# Patient Record
Sex: FEMALE | Race: WHITE | Employment: UNEMPLOYED | URBAN - METROPOLITAN AREA
[De-identification: names, ages, dates, MRNs, and addresses within clinical notes are randomized per-mention and may not be internally consistent; named-entity substitution may affect disease eponyms.]

---

## 2017-04-03 ENCOUNTER — HOSPITAL ENCOUNTER (EMERGENCY)
Facility: HOSPITAL | Age: 42
Discharge: HOME | End: 2017-04-04
Attending: EMERGENCY MEDICINE
Payer: COMMERCIAL

## 2017-04-03 ENCOUNTER — OFFICE VISIT (OUTPATIENT)
Dept: INTERNAL MEDICINE CLINIC | Facility: CLINIC | Age: 42
End: 2017-04-03

## 2017-04-03 VITALS
RESPIRATION RATE: 18 BRPM | SYSTOLIC BLOOD PRESSURE: 116 MMHG | DIASTOLIC BLOOD PRESSURE: 70 MMHG | OXYGEN SATURATION: 94 % | HEIGHT: 64 IN | WEIGHT: 170 LBS | TEMPERATURE: 100 F | HEART RATE: 108 BPM | BODY MASS INDEX: 29.02 KG/M2

## 2017-04-03 VITALS
HEART RATE: 112 BPM | OXYGEN SATURATION: 98 % | SYSTOLIC BLOOD PRESSURE: 94 MMHG | WEIGHT: 188 LBS | TEMPERATURE: 99 F | DIASTOLIC BLOOD PRESSURE: 68 MMHG | HEIGHT: 64 IN | RESPIRATION RATE: 16 BRPM | BODY MASS INDEX: 32.1 KG/M2

## 2017-04-03 DIAGNOSIS — J02.0 ACUTE STREPTOCOCCAL PHARYNGITIS: Primary | ICD-10-CM

## 2017-04-03 DIAGNOSIS — B96.89 ACUTE BACTERIAL PHARYNGITIS: Primary | ICD-10-CM

## 2017-04-03 DIAGNOSIS — J02.8 ACUTE BACTERIAL PHARYNGITIS: Primary | ICD-10-CM

## 2017-04-03 DIAGNOSIS — J02.9 SORE THROAT: ICD-10-CM

## 2017-04-03 PROCEDURE — 99284 EMERGENCY DEPT VISIT MOD MDM: CPT

## 2017-04-03 PROCEDURE — 87880 STREP A ASSAY W/OPTIC: CPT | Performed by: STUDENT IN AN ORGANIZED HEALTH CARE EDUCATION/TRAINING PROGRAM

## 2017-04-03 PROCEDURE — 96361 HYDRATE IV INFUSION ADD-ON: CPT

## 2017-04-03 PROCEDURE — 96375 TX/PRO/DX INJ NEW DRUG ADDON: CPT

## 2017-04-03 PROCEDURE — 85027 COMPLETE CBC AUTOMATED: CPT | Performed by: EMERGENCY MEDICINE

## 2017-04-03 PROCEDURE — 99213 OFFICE O/P EST LOW 20 MIN: CPT | Performed by: STUDENT IN AN ORGANIZED HEALTH CARE EDUCATION/TRAINING PROGRAM

## 2017-04-03 PROCEDURE — 85007 BL SMEAR W/DIFF WBC COUNT: CPT | Performed by: EMERGENCY MEDICINE

## 2017-04-03 PROCEDURE — 85025 COMPLETE CBC W/AUTO DIFF WBC: CPT | Performed by: EMERGENCY MEDICINE

## 2017-04-03 PROCEDURE — 96365 THER/PROPH/DIAG IV INF INIT: CPT

## 2017-04-03 RX ORDER — HYDROMORPHONE HYDROCHLORIDE 1 MG/ML
0.5 INJECTION, SOLUTION INTRAMUSCULAR; INTRAVENOUS; SUBCUTANEOUS ONCE
Status: COMPLETED | OUTPATIENT
Start: 2017-04-03 | End: 2017-04-03

## 2017-04-03 RX ORDER — AMOXICILLIN AND CLAVULANATE POTASSIUM 875; 125 MG/1; MG/1
1 TABLET, FILM COATED ORAL 2 TIMES DAILY
Qty: 20 TABLET | Refills: 0 | Status: SHIPPED | OUTPATIENT
Start: 2017-04-03 | End: 2017-04-13

## 2017-04-03 RX ORDER — ONDANSETRON 4 MG/1
4 TABLET, ORALLY DISINTEGRATING ORAL EVERY 4 HOURS PRN
Qty: 10 TABLET | Refills: 0 | Status: SHIPPED | OUTPATIENT
Start: 2017-04-03 | End: 2017-04-10

## 2017-04-03 RX ORDER — KETOROLAC TROMETHAMINE 30 MG/ML
30 INJECTION, SOLUTION INTRAMUSCULAR; INTRAVENOUS ONCE
Status: COMPLETED | OUTPATIENT
Start: 2017-04-03 | End: 2017-04-03

## 2017-04-03 RX ORDER — DEXAMETHASONE SODIUM PHOSPHATE 4 MG/ML
10 VIAL (ML) INJECTION ONCE
Status: COMPLETED | OUTPATIENT
Start: 2017-04-03 | End: 2017-04-03

## 2017-04-03 NOTE — PROGRESS NOTES
HPI:    Patient ID: Mercedes Dumont is a 46year old female. Sore Throat   This is a new problem. Episode onset: 2 days ago. The problem has been gradually worsening. The maximum temperature recorded prior to her arrival was 102 - 102.9 F.  The fever h Apply Externally Cream Apply pea size amount to face every night. Disp: 45 g Rfl: 6     Allergies:No Known Allergies   PHYSICAL EXAM:   Physical Exam   Constitutional: She is oriented to person, place, and time.  She appears well-developed and well-nourishe at least 3 days.  Do not share utensils or drinks with anyone. Follow up in 3-5 days if not improving, condition worsens, or fever greater than or equal to 100.4 persists for 72 hours.       After visit instructions are provided to the patient.   The ami

## 2017-04-04 NOTE — ED PROVIDER NOTES
Patient Seen in: [facility] Emergency Department    History   Patient presents with:  Sore Throat    Stated Complaint: throat pain/swelling    HPI    42-year-old female presents the emergency department for complaints of sore throat.   Patient was se socially       Review of Systems    Positive for stated complaint: throat pain/swelling  Other systems are as noted in HPI. Constitutional and vital signs reviewed. All other systems reviewed and negative except as noted above.     PSFH elements revie Abnormality         Status                     ---------                               -----------         ------                     CBC W/ DIFFERENTIAL[[id number]]          Abnormal            Final result                 Please view results for these thi worsen      Medications Prescribed:  Current Discharge Medication List    START taking these medications    ondansetron 4 MG Oral Tablet Dispersible  Take 1 tablet (4 mg total) by mouth every 4 (four) hours as needed for Nausea.   Qty: 10 tablet Refills: 0

## 2017-04-04 NOTE — ED INITIAL ASSESSMENT (HPI)
C/o throat swelling, saw PCP and dx. Strep Pharyngitis.  Pt on ABT- Augmentin, unable to tolerate meds and po

## 2017-04-06 ENCOUNTER — TELEPHONE (OUTPATIENT)
Dept: INTERNAL MEDICINE CLINIC | Facility: CLINIC | Age: 42
End: 2017-04-06

## 2017-04-06 PROBLEM — J03.00 STREP TONSILLITIS: Status: ACTIVE | Noted: 2017-04-06

## 2017-04-06 PROBLEM — J02.9 SORE THROAT: Status: ACTIVE | Noted: 2017-04-06

## 2017-04-06 NOTE — TELEPHONE ENCOUNTER
Pt was seen in the office on 4/3/2017 for strep:   Plan:  Prescription: Augmentin BID x10 days  Risks, benefits, complications and side effects of meds discussed with patient. OTC Tylenol/Motrin prn.  Push fluids - warm or cool liquids, whichever is sooth

## 2017-04-06 NOTE — TELEPHONE ENCOUNTER
Patient LM on VM and stated she has questions about her strep throat. She visited MD earlier this week. Please advise.

## 2017-04-24 ENCOUNTER — OFFICE VISIT (OUTPATIENT)
Dept: INTERNAL MEDICINE CLINIC | Facility: CLINIC | Age: 42
End: 2017-04-24

## 2017-04-24 VITALS
OXYGEN SATURATION: 98 % | DIASTOLIC BLOOD PRESSURE: 80 MMHG | BODY MASS INDEX: 31.92 KG/M2 | RESPIRATION RATE: 16 BRPM | SYSTOLIC BLOOD PRESSURE: 110 MMHG | HEIGHT: 64 IN | HEART RATE: 106 BPM | TEMPERATURE: 99 F | WEIGHT: 187 LBS

## 2017-04-24 DIAGNOSIS — Z12.31 ENCOUNTER FOR SCREENING MAMMOGRAM FOR MALIGNANT NEOPLASM OF BREAST: ICD-10-CM

## 2017-04-24 DIAGNOSIS — E55.9 VITAMIN D DEFICIENCY: ICD-10-CM

## 2017-04-24 DIAGNOSIS — Z00.00 PHYSICAL EXAM, ANNUAL: Primary | ICD-10-CM

## 2017-04-24 PROBLEM — J02.9 SORE THROAT: Status: RESOLVED | Noted: 2017-04-06 | Resolved: 2017-04-24

## 2017-04-24 PROBLEM — J03.00 STREP TONSILLITIS: Status: RESOLVED | Noted: 2017-04-06 | Resolved: 2017-04-24

## 2017-04-24 PROCEDURE — 99395 PREV VISIT EST AGE 18-39: CPT | Performed by: INTERNAL MEDICINE

## 2017-04-24 RX ORDER — ERGOCALCIFEROL 1.25 MG/1
50000 CAPSULE ORAL WEEKLY
Qty: 12 CAPSULE | Refills: 0 | Status: SHIPPED | OUTPATIENT
Start: 2017-04-24 | End: 2017-05-24

## 2017-04-24 NOTE — PROGRESS NOTES
HPI:    Patient ID: Alec Em is a 46year old female. HPI  HPI:   Alec Em is a 46year old female who presents for a complete physical exam. Symptoms: denies discharge, itching, burning or dysuria, periods are regular.  Patient complai Age of Onset   • Diabetes Father    • Hypertension Mother    • Hypertension Maternal Grandmother    • Other[other] [OTHER] Paternal Grandmother      MS   • Other[other] [OTHER] Paternal Grandfather      spinal stenosis      Social History:     Smoking Stat and sensory are grossly intact    ASSESSMENT AND PLAN:   Carlos Stearns is a 46year old female who presents for a complete physical exam.  Pap and pelvic done by gyne. Order put in for mammogram . Self breast exam explained.  Health maintenance, will ch

## 2017-07-20 ENCOUNTER — APPOINTMENT (OUTPATIENT)
Dept: LAB | Age: 42
End: 2017-07-20
Attending: INTERNAL MEDICINE
Payer: COMMERCIAL

## 2017-07-20 DIAGNOSIS — E55.9 VITAMIN D DEFICIENCY: ICD-10-CM

## 2017-07-20 LAB — 25-HYDROXYVITAMIN D (TOTAL): 37.7 NG/ML (ref 30–100)

## 2017-07-20 PROCEDURE — 36415 COLL VENOUS BLD VENIPUNCTURE: CPT | Performed by: INTERNAL MEDICINE

## 2017-07-20 PROCEDURE — 82306 VITAMIN D 25 HYDROXY: CPT | Performed by: INTERNAL MEDICINE

## 2017-09-01 DIAGNOSIS — F41.9 ANXIETY: ICD-10-CM

## 2017-09-01 NOTE — TELEPHONE ENCOUNTER
Pt left a message asking for a refill of alprazolam 0.5 MG be sent to [address] on file. Call her at [phone] if any questions.

## 2017-09-04 RX ORDER — ALPRAZOLAM 0.5 MG/1
0.5 TABLET ORAL 2 TIMES DAILY PRN
Qty: 60 TABLET | Refills: 1 | OUTPATIENT
Start: 2017-09-04 | End: 2018-04-26

## 2017-10-06 ENCOUNTER — HOSPITAL ENCOUNTER (OUTPATIENT)
Dept: MAMMOGRAPHY | Age: 42
Discharge: HOME | End: 2017-10-06
Attending: INTERNAL MEDICINE
Payer: COMMERCIAL

## 2017-10-06 DIAGNOSIS — Z12.31 ENCOUNTER FOR SCREENING MAMMOGRAM FOR MALIGNANT NEOPLASM OF BREAST: ICD-10-CM

## 2017-10-06 PROCEDURE — 77067 SCR MAMMO BI INCL CAD: CPT | Performed by: INTERNAL MEDICINE

## 2018-03-13 ENCOUNTER — PRIOR ORIGINAL RECORDS (OUTPATIENT)
Dept: OTHER | Age: 43
End: 2018-03-13

## 2018-03-23 ENCOUNTER — TELEPHONE (OUTPATIENT)
Dept: INTERNAL MEDICINE CLINIC | Facility: CLINIC | Age: 43
End: 2018-03-23

## 2018-03-23 DIAGNOSIS — Z13.0 SCREENING FOR ENDOCRINE/METABOLIC/IMMUNITY DISORDERS: ICD-10-CM

## 2018-03-23 DIAGNOSIS — Z13.220 SCREENING FOR LIPOID DISORDERS: ICD-10-CM

## 2018-03-23 DIAGNOSIS — Z13.228 SCREENING FOR ENDOCRINE/METABOLIC/IMMUNITY DISORDERS: ICD-10-CM

## 2018-03-23 DIAGNOSIS — Z13.1 SCREENING FOR DIABETES MELLITUS: ICD-10-CM

## 2018-03-23 DIAGNOSIS — Z13.29 SCREENING FOR THYROID DISORDER: ICD-10-CM

## 2018-03-23 DIAGNOSIS — Z82.49 FAMILY HISTORY OF CARDIOMYOPATHY: Primary | ICD-10-CM

## 2018-03-23 DIAGNOSIS — Z13.0 SCREENING FOR IRON DEFICIENCY ANEMIA: ICD-10-CM

## 2018-03-23 DIAGNOSIS — Z13.29 SCREENING FOR ENDOCRINE/METABOLIC/IMMUNITY DISORDERS: ICD-10-CM

## 2018-03-23 DIAGNOSIS — Z13.89 SCREENING FOR BLOOD OR PROTEIN IN URINE: ICD-10-CM

## 2018-03-23 NOTE — TELEPHONE ENCOUNTER
Per Dr Leilani Acuña check 2D Echo and normal physical labs. D/w pt above testing she expressed understanding. Pt aware not to schedule Echo until notified by referral dept that insurance has approved. Orders placed.

## 2018-03-23 NOTE — TELEPHONE ENCOUNTER
Physical lab orders--edward, also pt's father passed away 7 weeks ago from Cardiomyopathy and was wondering if any addition testing should be done

## 2018-04-03 ENCOUNTER — HOSPITAL ENCOUNTER (OUTPATIENT)
Dept: CV DIAGNOSTICS | Facility: HOSPITAL | Age: 43
Discharge: HOME | End: 2018-04-03
Attending: INTERNAL MEDICINE
Payer: COMMERCIAL

## 2018-04-03 DIAGNOSIS — Z82.49 FAMILY HISTORY OF CARDIOMYOPATHY: ICD-10-CM

## 2018-04-03 PROCEDURE — 93306 TTE W/DOPPLER COMPLETE: CPT | Performed by: INTERNAL MEDICINE

## 2018-04-24 ENCOUNTER — MED REC SCAN ONLY (OUTPATIENT)
Dept: INTERNAL MEDICINE CLINIC | Facility: CLINIC | Age: 43
End: 2018-04-24

## 2018-04-26 ENCOUNTER — OFFICE VISIT (OUTPATIENT)
Dept: INTERNAL MEDICINE CLINIC | Facility: CLINIC | Age: 43
End: 2018-04-26

## 2018-04-26 VITALS
TEMPERATURE: 98 F | SYSTOLIC BLOOD PRESSURE: 126 MMHG | HEART RATE: 94 BPM | RESPIRATION RATE: 18 BRPM | BODY MASS INDEX: 33.46 KG/M2 | WEIGHT: 196 LBS | HEIGHT: 64 IN | DIASTOLIC BLOOD PRESSURE: 84 MMHG | OXYGEN SATURATION: 98 %

## 2018-04-26 DIAGNOSIS — F41.9 ANXIETY: ICD-10-CM

## 2018-04-26 DIAGNOSIS — Z00.00 PHYSICAL EXAM, ANNUAL: Primary | ICD-10-CM

## 2018-04-26 PROCEDURE — 99396 PREV VISIT EST AGE 40-64: CPT | Performed by: INTERNAL MEDICINE

## 2018-04-26 RX ORDER — ALPRAZOLAM 0.5 MG/1
0.5 TABLET ORAL 2 TIMES DAILY PRN
Qty: 60 TABLET | Refills: 1 | Status: SHIPPED | OUTPATIENT
Start: 2018-04-26 | End: 2018-11-19

## 2018-04-26 NOTE — PROGRESS NOTES
HPI:    Patient ID: Kendal Louis is a 38year old female. HPI  HPI:   Kendal Louis is a 38year old female who presents for a complete physical exam. Symptoms: denies discharge, itching, burning or dysuria, periods are regular.  Patient complai       Past Surgical History:  , , :       Comment: x3    Family History   Problem Relation Age of Onset   • Diabetes Father    • Hypertension Mother    • Hypertension Maternal Grandmother    • Other [OTHER] Paternal Grandmother three,motor and sensory are grossly intact    ASSESSMENT AND PLAN:   Pantera Agarwal is a 38year old female who presents for a complete physical exam.  Pap and pelvic done by gyne. Order put in for mammogram . Self breast exam explained.  Maria Elena Enriquez

## 2018-08-07 ENCOUNTER — TELEPHONE (OUTPATIENT)
Dept: INTERNAL MEDICINE CLINIC | Facility: CLINIC | Age: 43
End: 2018-08-07

## 2018-08-07 NOTE — TELEPHONE ENCOUNTER
Pt was on vacation and got stung by a stingray (rt foot) 8 days ago, was not given any medication, but today it is very swollen and would like to speak to the nurse about it, call back

## 2018-08-07 NOTE — TELEPHONE ENCOUNTER
Left message for the pt to give the office a call back. The pt called the office to report being stung by a sting ray. [place] and the pt stayed away from the ocean the next few days.      The pt woke up this morning

## 2018-08-08 ENCOUNTER — OFFICE VISIT (OUTPATIENT)
Dept: INTERNAL MEDICINE CLINIC | Facility: CLINIC | Age: 43
End: 2018-08-08
Payer: COMMERCIAL

## 2018-08-08 VITALS
WEIGHT: 194 LBS | TEMPERATURE: 99 F | HEART RATE: 86 BPM | HEIGHT: 64 IN | SYSTOLIC BLOOD PRESSURE: 118 MMHG | DIASTOLIC BLOOD PRESSURE: 72 MMHG | BODY MASS INDEX: 33.12 KG/M2 | RESPIRATION RATE: 16 BRPM

## 2018-08-08 DIAGNOSIS — B37.3 VAGINAL YEAST INFECTION: ICD-10-CM

## 2018-08-08 DIAGNOSIS — L03.115 CELLULITIS OF RIGHT LOWER EXTREMITY: Primary | ICD-10-CM

## 2018-08-08 PROCEDURE — 99213 OFFICE O/P EST LOW 20 MIN: CPT | Performed by: NURSE PRACTITIONER

## 2018-08-08 RX ORDER — DOXYCYCLINE HYCLATE 100 MG/1
100 CAPSULE ORAL 2 TIMES DAILY
Qty: 14 CAPSULE | Refills: 0 | Status: SHIPPED | OUTPATIENT
Start: 2018-08-08 | End: 2018-10-04

## 2018-08-08 RX ORDER — FLUCONAZOLE 150 MG/1
150 TABLET ORAL ONCE AS NEEDED
Qty: 5 TABLET | Refills: 0 | Status: SHIPPED | OUTPATIENT
Start: 2018-08-08 | End: 2018-08-08

## 2018-08-08 NOTE — PROGRESS NOTES
HPI:    Patient ID: Saurabh Record is a 38year old female.     Patient presents with:  Swelling: pt got stung by a sting ray 9 days ago, right ankle swelling and redness, hurts to walk      HPI  Patient was vacationing in [place] and was stung by fluconazole 150 MG Oral Tab Take 1 tablet (150 mg total) by mouth once as needed. Disp: 5 tablet Rfl: 0   ALPRAZolam 0.5 MG Oral Tab Take 1 tablet (0.5 mg total) by mouth 2 (two) times daily as needed for Anxiety.  Disp: 60 tablet Rfl: 1   ValACYclovir HCl Pulmonary/Chest: Effort normal and breath sounds normal.   Abdominal: Soft. Bowel sounds are normal.   Musculoskeletal:        Right ankle: She exhibits decreased range of motion and swelling. She exhibits normal pulse.         Feet:    Skin: Skin is warm a

## 2018-08-10 ENCOUNTER — TELEPHONE (OUTPATIENT)
Dept: INTERNAL MEDICINE CLINIC | Facility: CLINIC | Age: 43
End: 2018-08-10

## 2018-08-14 ENCOUNTER — OFFICE VISIT (OUTPATIENT)
Dept: INTERNAL MEDICINE CLINIC | Facility: CLINIC | Age: 43
End: 2018-08-14
Payer: COMMERCIAL

## 2018-08-14 VITALS
BODY MASS INDEX: 33.12 KG/M2 | HEIGHT: 64 IN | DIASTOLIC BLOOD PRESSURE: 72 MMHG | TEMPERATURE: 98 F | HEART RATE: 92 BPM | RESPIRATION RATE: 16 BRPM | SYSTOLIC BLOOD PRESSURE: 114 MMHG | WEIGHT: 194 LBS

## 2018-08-14 DIAGNOSIS — L03.115 CELLULITIS OF RIGHT LOWER EXTREMITY: Primary | ICD-10-CM

## 2018-08-14 PROCEDURE — 99213 OFFICE O/P EST LOW 20 MIN: CPT | Performed by: NURSE PRACTITIONER

## 2018-08-14 NOTE — PROGRESS NOTES
HPI:    Patient ID: Feliberto Salas is a 38year old female.     Patient presents with:  Cellulitis (integumentary, infectious): reports improvement; only reports itching and tenderness around the sting site; Rm 1    HPI  Patient is seen in follow up toda ValACYclovir HCl (VALTREX) 500 MG Oral Tab Take 1 tablet (500 mg total) by mouth 2 (two) times daily as needed. Disp: 14 tablet Rfl: 3   tretinoin (RETIN-A) 0.025 % Apply Externally Cream Apply pea size amount to face every night.  Disp: 45 g Rfl: 6     All Cardiovascular: Normal rate, regular rhythm and normal heart sounds. Pulmonary/Chest: Effort normal and breath sounds normal.   Abdominal: Soft. Bowel sounds are normal.   Musculoskeletal:        Feet:    Healing wound, mild redness around site.  Mild pr

## 2018-10-04 ENCOUNTER — OFFICE VISIT (OUTPATIENT)
Dept: INTERNAL MEDICINE CLINIC | Facility: CLINIC | Age: 43
End: 2018-10-04
Payer: COMMERCIAL

## 2018-10-04 VITALS
WEIGHT: 194 LBS | TEMPERATURE: 98 F | HEART RATE: 94 BPM | RESPIRATION RATE: 16 BRPM | BODY MASS INDEX: 33.12 KG/M2 | SYSTOLIC BLOOD PRESSURE: 116 MMHG | OXYGEN SATURATION: 99 % | DIASTOLIC BLOOD PRESSURE: 74 MMHG | HEIGHT: 64 IN

## 2018-10-04 DIAGNOSIS — R05.9 COUGH: ICD-10-CM

## 2018-10-04 DIAGNOSIS — J06.9 UPPER RESPIRATORY INFECTION, VIRAL: Primary | ICD-10-CM

## 2018-10-04 PROCEDURE — 90471 IMMUNIZATION ADMIN: CPT | Performed by: NURSE PRACTITIONER

## 2018-10-04 PROCEDURE — 90686 IIV4 VACC NO PRSV 0.5 ML IM: CPT | Performed by: NURSE PRACTITIONER

## 2018-10-04 PROCEDURE — 99213 OFFICE O/P EST LOW 20 MIN: CPT | Performed by: NURSE PRACTITIONER

## 2018-10-04 RX ORDER — CODEINE PHOSPHATE AND GUAIFENESIN 10; 100 MG/5ML; MG/5ML
5 SOLUTION ORAL 3 TIMES DAILY PRN
Qty: 120 ML | Refills: 0 | Status: SHIPPED | OUTPATIENT
Start: 2018-10-04

## 2018-10-04 RX ORDER — BENZONATATE 200 MG/1
200 CAPSULE ORAL 3 TIMES DAILY PRN
Qty: 20 CAPSULE | Refills: 0 | Status: SHIPPED | OUTPATIENT
Start: 2018-10-04

## 2018-10-04 RX ORDER — FLUOCINOLONE ACETONIDE, HYDROQUINONE, AND TRETINOIN .1; 40; .5 MG/G; MG/G; MG/G
CREAM TOPICAL
Refills: 0 | COMMUNITY
Start: 2018-08-21

## 2018-10-04 NOTE — PROGRESS NOTES
HPI:    Patient ID: Pearl Bee is a 41year old female. Patient presents with:  Cough: x2 weeks-sinus congestion, cough, fatigue, sob    Cough   This is a new problem. The current episode started 1 to 4 weeks ago.  The problem has been waxing and Transportation needs - medical: Not on file      Transportation needs - non-medical: Not on file    Occupational History      Not on file    Tobacco Use      Smoking status: Never Smoker      Smokeless tobacco: Never Used    Substance and Sexual Activity Allergies    Lab Results   Component Value Date    GLU 93 04/23/2018    BUN 13 03/03/2016    CREATSERUM 0.76 04/23/2018    GFR 98 04/23/2018    GFRNAA > 90 11/08/2012    GFRAA > 90 11/08/2012    CA 8.8 03/03/2016    ALKPHO 73 03/03/2016    AST 24 03/03/201 Conjunctivae are normal.   Neck: Normal range of motion. Neck supple. Cardiovascular: Normal rate, regular rhythm and normal heart sounds. No murmur heard. Edema not present.   Pulmonary/Chest: Effort normal and breath sounds normal. No respiratory d

## 2018-10-04 NOTE — PATIENT INSTRUCTIONS
Flonase 1 spray twice a day x 14 days  Increase water intake  Sudafed as needed for congestion  Mucinex as needed for congestion

## 2018-11-19 DIAGNOSIS — F41.9 ANXIETY: ICD-10-CM

## 2018-11-19 RX ORDER — ALPRAZOLAM 0.5 MG/1
0.5 TABLET ORAL 2 TIMES DAILY PRN
Qty: 60 TABLET | Refills: 1 | Status: SHIPPED
Start: 2018-11-19

## 2019-01-17 ENCOUNTER — PRIOR ORIGINAL RECORDS (OUTPATIENT)
Dept: OTHER | Age: 44
End: 2019-01-17

## 2019-01-28 ENCOUNTER — PRIOR ORIGINAL RECORDS (OUTPATIENT)
Dept: OTHER | Age: 44
End: 2019-01-28

## 2019-01-28 ENCOUNTER — MYAURORA ACCOUNT LINK (OUTPATIENT)
Dept: OTHER | Age: 44
End: 2019-01-28

## 2019-02-28 VITALS
DIASTOLIC BLOOD PRESSURE: 80 MMHG | HEART RATE: 66 BPM | HEIGHT: 64 IN | WEIGHT: 170 LBS | SYSTOLIC BLOOD PRESSURE: 140 MMHG | BODY MASS INDEX: 29.02 KG/M2 | RESPIRATION RATE: 16 BRPM

## 2019-02-28 VITALS
DIASTOLIC BLOOD PRESSURE: 60 MMHG | WEIGHT: 180 LBS | HEART RATE: 80 BPM | RESPIRATION RATE: 16 BRPM | HEIGHT: 64 IN | BODY MASS INDEX: 30.73 KG/M2 | SYSTOLIC BLOOD PRESSURE: 110 MMHG

## 2023-08-25 ENCOUNTER — OFFICE VISIT (OUTPATIENT)
Dept: SURGERY | Facility: CLINIC | Age: 48
End: 2023-08-25

## 2023-08-25 VITALS
BODY MASS INDEX: 27.23 KG/M2 | RESPIRATION RATE: 16 BRPM | DIASTOLIC BLOOD PRESSURE: 86 MMHG | TEMPERATURE: 98 F | HEART RATE: 2 BPM | OXYGEN SATURATION: 99 % | HEIGHT: 63.5 IN | SYSTOLIC BLOOD PRESSURE: 134 MMHG | WEIGHT: 155.63 LBS

## 2023-08-25 DIAGNOSIS — N64.81 PTOSIS OF BOTH BREASTS: Primary | ICD-10-CM

## 2023-08-25 RX ORDER — ASCORBIC ACID 500 MG
500 TABLET ORAL DAILY
COMMUNITY

## 2023-09-05 ENCOUNTER — TELEPHONE (OUTPATIENT)
Dept: SURGERY | Facility: CLINIC | Age: 48
End: 2023-09-05

## 2023-09-05 ENCOUNTER — DOCUMENTATION ONLY (OUTPATIENT)
Dept: SURGERY | Facility: CLINIC | Age: 48
End: 2023-09-05

## 2023-09-05 DIAGNOSIS — E65 ABDOMINAL PANNUS: Primary | ICD-10-CM

## 2023-09-05 NOTE — PATIENT INSTRUCTIONS
Surgeon:         Dr. Magali Arrington                                        Tel:         [phone]                                  Fax:        [phone]    Surgery/Procedure:*THIS IS NOT A PRE-OP*  Abdominoplasty (Cosmetic). 3 hours, general anesthesia, outpatient. Dx Code: L22 (abdominal pannus)    Hospital:  [facility]: [address] [place] [address], [place], [place]            [phone]  [facility]: [address], [facility]               [phone]    1. Someone will need to drive you to and from the hospital if your procedure is outpatient. 2.Do not drink alcohol or smoke 24 hours prior to your procedure. 3. Bring a picture ID and your insurance card. 4. You will be contacted by the hospital the day before to confirm the procedure time and location. 5. The hospital will also contact you approximately one week before surgery to schedule your COVID test (only if surgery is inpatient/overnight stay). Please inform us if you develop any Covid-19 like symptoms, test positive or have been exposed for Covid- 19 prior to surgery. 6. Do not take any herbal supplements or blood thinners at least one week before your procedure/surgery. This includes NSAID's (aspirin, baby aspirin, Motrin, Ibuprofen, Aleve, Advil, Naproxen, etc), Plavix, fish oil, vitamin E, turmeric, CoQ10, or green tea supplements, etc. *TYLENOL or acetaminophen is ok to take*    7. PRE-OPERATIVE TESTING: History and physical with medical clearance is REQUIRED within 30 days of the surgery date and is mandatory per Dr. Bello Walters. *If this is not done, your surgery will be postponed*  MEDICAL CLEARANCE WITH  ____  CBC  CMP  EKG    8. Please inform us if you start or change any medications at least one week before surgery (ex: blood thinners, weight loss medications, diabetic medications, herbal supplements, etc)    9. Does patient have diagnosis of sleep apnea?     [   ] Yes     [   ]  No    Consent obtained  Photos taken on _________

## 2023-09-05 NOTE — TELEPHONE ENCOUNTER
Calling pt in regards to scheduling surgery. Informed pt that I have 04/25/2024 available at [facility] with Dr. Syed Olivas. Pt verbalized understanding and in agreement with date and location. All questions answered. Encouraged pt to call or [facility] message office with any other questions or concerns.

## 2023-12-11 ENCOUNTER — TELEPHONE (OUTPATIENT)
Dept: SURGERY | Facility: CLINIC | Age: 48
End: 2023-12-11

## 2023-12-11 DIAGNOSIS — E65 ABDOMINAL PANNUS: Primary | ICD-10-CM

## 2023-12-11 NOTE — TELEPHONE ENCOUNTER
Patient called to reschedule surgery to 06/13/2024 at [facility] with Dr. Patino Common

## 2024-01-15 ENCOUNTER — TELEPHONE (OUTPATIENT)
Dept: SURGERY | Facility: CLINIC | Age: 49
End: 2024-01-15

## 2024-01-15 NOTE — TELEPHONE ENCOUNTER
Pt called [facility],called her back and she stated that she wanted to schedule her Pre - op appt, called pt [facility] pt has a pre - op appt already schedule for 01/26/24 at 11:30 am with Dr. Leyva will wait for pt to call back if she would like to keep this appt.

## 2024-05-03 ENCOUNTER — OFFICE VISIT (OUTPATIENT)
Dept: SURGERY | Facility: CLINIC | Age: 49
End: 2024-05-03
Payer: COMMERCIAL

## 2024-05-03 DIAGNOSIS — M62.08 RECTUS DIASTASIS: Primary | ICD-10-CM

## 2024-05-03 NOTE — PROGRESS NOTES
Nikki Torres is a 48 year old female who presents today for a preoperative visit in anticipation of abdominoplasty.    Physical Examination:  Abdomen: A moderate lower abdominal pannus and striations is noted.  A well-healed fine-line low transverse scar is noted.  An approximately 2 cm rectus diastases is noted centered at the umbilicus.  There are no palpable hernias noted.     Assessment and Plan:  The nature and technique of abdominoplasty was described to the patient.  We reviewed the components of abdominoplasty including fascial plication, removal of the excess soft tissue of the lower abdomen, and umbilical transposition.  The nature of the periumbilical and lower abdominal scars was described to the patient.  The risks of surgery including but not limited to bleeding, infection, seroma, dehiscence, skin necrosis, injury to intra-abdominal viscera, contour abnormality, umbilical necrosis, nerve injury and subsequent numbness, hypertrophic scarring or keloid, swelling, scar visibility, as well as medical risks including DVT and PE.  As the patient is currently on hormone replacement therapy, we discussed perioperative cessation.  We also discussed possible need for chemical VTE prophylaxis if she is unable to transiently stop her hormonal therapy.  We reviewed the expected postoperative course including need for drains, activity limitation, abdominal binder, and suture removal.  Multiple questions were answered the patient's satisfaction.  No guarantees as to outcome were offered.  The patient expresses understanding and wishes to proceed.

## 2024-05-03 NOTE — PROGRESS NOTES
Surgeon:         Dr. Mason Leyva                                        Tel:         [phone]                                  Fax:        [phone]    Surgery/Procedure: Abdominoplasty. 3 hours, general anesthesia, outpatient.     Dx Code:     Hospital:  [facility]: [address] [place] [address]           [phone]  [facility]: [address] [facility], [place] [address]               [phone]    1. Someone will need to drive you to and from the hospital if your procedure is outpatient.    2.Do not drink alcohol or smoke 24 hours prior to your procedure.    3. Bring a picture ID and your insurance card.    4. You will be contacted by the hospital the day before to confirm the procedure time and location.     5. Do not take any herbal supplements or blood thinners at least one week before your procedure/surgery. This includes NSAID's (aspirin, baby aspirin, Motrin, Ibuprofen, Aleve, Advil, Naproxen, etc), Plavix, fish oil, vitamin E, turmeric, CoQ10, or green tea supplements, etc. *TYLENOL or acetaminophen is ok to take*    6. PRE-OPERATIVE TESTING: History and physical with medical clearance is REQUIRED within 30 days of the surgery date and is mandatory per Dr. Leyva. *If this is not done, your surgery will be postponed*  MEDICAL CLEARANCE WITH DR. Delacruz  CBC  CMP  EKG (within 90 days)    7. If you take Coumadin, Plavix, Xarelto, or Eliquis, please contact your prescribing physician for special instructions on how long to hold. If you take insulin, contact your primary care physician for special instructions.     8. Please inform us if you start or change any medications at least one week before surgery (ex: blood thinners, weight loss medications, diabetic medications, herbal supplements, etc)    9. Does patient have diagnosis of sleep apnea?    [   ] Yes     [ X ]  No    Consent obtained  Photos taken on 5/3/24

## 2024-05-09 ENCOUNTER — TELEPHONE (OUTPATIENT)
Dept: SURGERY | Facility: CLINIC | Age: 49
End: 2024-05-09

## 2024-05-10 ENCOUNTER — TELEPHONE (OUTPATIENT)
Dept: SURGERY | Facility: CLINIC | Age: 49
End: 2024-05-10

## 2024-05-10 NOTE — TELEPHONE ENCOUNTER
Corby () returning my call to process patient's deposit for her upcoming cosmetic case with Dr. Leyva on 6/13/24.   Would like me to process the same card for the balance due on 6/10/24.  Let Corby know that I will mail the pt all the receipts and cosmetassure information along with my card should there be any additional questions or concerns.  Corby verbalized understanding and was appreciative of my help.

## 2024-05-10 NOTE — TELEPHONE ENCOUNTER
Called patient and asked if she wanted to move surgery up and patient said no   moderate assist (50% patients effort)

## 2024-06-05 ENCOUNTER — TELEPHONE (OUTPATIENT)
Dept: SURGERY | Facility: CLINIC | Age: 49
End: 2024-06-05

## 2024-06-05 NOTE — TELEPHONE ENCOUNTER
Talked to Judy at Dr. Ferrera office and will have labs, EKG with tracing and MCL faxed over today pt had pre op visit on 06/04/24. nv

## 2024-06-12 ENCOUNTER — ANESTHESIA EVENT (OUTPATIENT)
Dept: SURGERY | Facility: HOSPITAL | Age: 49
End: 2024-06-12

## 2024-06-13 ENCOUNTER — ANESTHESIA (OUTPATIENT)
Dept: SURGERY | Facility: HOSPITAL | Age: 49
End: 2024-06-13

## 2024-06-13 ENCOUNTER — HOSPITAL ENCOUNTER (OUTPATIENT)
Facility: HOSPITAL | Age: 49
Setting detail: HOSPITAL OUTPATIENT SURGERY
Discharge: HOME | End: 2024-06-13
Attending: SURGERY | Admitting: SURGERY

## 2024-06-13 VITALS
WEIGHT: 158.19 LBS | RESPIRATION RATE: 16 BRPM | TEMPERATURE: 99 F | BODY MASS INDEX: 27.01 KG/M2 | HEIGHT: 64 IN | SYSTOLIC BLOOD PRESSURE: 110 MMHG | HEART RATE: 86 BPM | DIASTOLIC BLOOD PRESSURE: 73 MMHG | OXYGEN SATURATION: 99 %

## 2024-06-13 DIAGNOSIS — M62.08 RECTUS DIASTASIS: Primary | ICD-10-CM

## 2024-06-13 LAB — B-HCG UR QL: NEGATIVE

## 2024-06-13 PROCEDURE — 81025 URINE PREGNANCY TEST: CPT

## 2024-06-13 PROCEDURE — 0JB80ZZ EXCISION OF ABDOMEN SUBCUTANEOUS TISSUE AND FASCIA, OPEN APPROACH: ICD-10-PCS | Performed by: SURGERY

## 2024-06-13 PROCEDURE — 0J083ZZ ALTERATION OF ABDOMEN SUBCUTANEOUS TISSUE AND FASCIA, PERCUTANEOUS APPROACH: ICD-10-PCS | Performed by: SURGERY

## 2024-06-13 RX ORDER — HYDROCODONE BITARTRATE AND ACETAMINOPHEN 10; 325 MG/1; MG/1
1 TABLET ORAL ONCE AS NEEDED
Status: COMPLETED | OUTPATIENT
Start: 2024-06-13 | End: 2024-06-13

## 2024-06-13 RX ORDER — ALBUTEROL SULFATE 2.5 MG/3ML
2.5 SOLUTION RESPIRATORY (INHALATION) AS NEEDED
Status: DISCONTINUED | OUTPATIENT
Start: 2024-06-13 | End: 2024-06-13

## 2024-06-13 RX ORDER — ONDANSETRON 4 MG/1
4 TABLET, FILM COATED ORAL EVERY 8 HOURS PRN
Qty: 12 TABLET | Refills: 0 | Status: SHIPPED | OUTPATIENT
Start: 2024-06-13

## 2024-06-13 RX ORDER — GLYCOPYRROLATE 0.2 MG/ML
INJECTION, SOLUTION INTRAMUSCULAR; INTRAVENOUS AS NEEDED
Status: DISCONTINUED | OUTPATIENT
Start: 2024-06-13 | End: 2024-06-13 | Stop reason: SURG

## 2024-06-13 RX ORDER — ZINC SULFATE 50(220)MG
220 CAPSULE ORAL DAILY
COMMUNITY

## 2024-06-13 RX ORDER — SODIUM CHLORIDE, SODIUM LACTATE, POTASSIUM CHLORIDE, CALCIUM CHLORIDE 600; 310; 30; 20 MG/100ML; MG/100ML; MG/100ML; MG/100ML
INJECTION, SOLUTION INTRAVENOUS CONTINUOUS
Status: DISCONTINUED | OUTPATIENT
Start: 2024-06-13 | End: 2024-06-13

## 2024-06-13 RX ORDER — DIPHENHYDRAMINE HYDROCHLORIDE 50 MG/ML
12.5 INJECTION INTRAMUSCULAR; INTRAVENOUS AS NEEDED
Status: DISCONTINUED | OUTPATIENT
Start: 2024-06-13 | End: 2024-06-13

## 2024-06-13 RX ORDER — HYDROMORPHONE HYDROCHLORIDE 1 MG/ML
0.6 INJECTION, SOLUTION INTRAMUSCULAR; INTRAVENOUS; SUBCUTANEOUS EVERY 5 MIN PRN
Status: DISCONTINUED | OUTPATIENT
Start: 2024-06-13 | End: 2024-06-13

## 2024-06-13 RX ORDER — HYDROCODONE BITARTRATE AND ACETAMINOPHEN 10; 325 MG/1; MG/1
2 TABLET ORAL ONCE AS NEEDED
Status: COMPLETED | OUTPATIENT
Start: 2024-06-13 | End: 2024-06-13

## 2024-06-13 RX ORDER — ONDANSETRON 2 MG/ML
INJECTION INTRAMUSCULAR; INTRAVENOUS AS NEEDED
Status: DISCONTINUED | OUTPATIENT
Start: 2024-06-13 | End: 2024-06-13 | Stop reason: SURG

## 2024-06-13 RX ORDER — MIDAZOLAM HYDROCHLORIDE 1 MG/ML
1 INJECTION INTRAMUSCULAR; INTRAVENOUS EVERY 5 MIN PRN
Status: DISCONTINUED | OUTPATIENT
Start: 2024-06-13 | End: 2024-06-13

## 2024-06-13 RX ORDER — LIDOCAINE HYDROCHLORIDE 10 MG/ML
INJECTION, SOLUTION EPIDURAL; INFILTRATION; INTRACAUDAL; PERINEURAL AS NEEDED
Status: DISCONTINUED | OUTPATIENT
Start: 2024-06-13 | End: 2024-06-13 | Stop reason: SURG

## 2024-06-13 RX ORDER — EPHEDRINE SULFATE 50 MG/ML
INJECTION INTRAVENOUS AS NEEDED
Status: DISCONTINUED | OUTPATIENT
Start: 2024-06-13 | End: 2024-06-13 | Stop reason: SURG

## 2024-06-13 RX ORDER — NALOXONE HYDROCHLORIDE 0.4 MG/ML
80 INJECTION, SOLUTION INTRAMUSCULAR; INTRAVENOUS; SUBCUTANEOUS AS NEEDED
Status: DISCONTINUED | OUTPATIENT
Start: 2024-06-13 | End: 2024-06-13

## 2024-06-13 RX ORDER — ACETAMINOPHEN 500 MG
1000 TABLET ORAL ONCE
Status: DISCONTINUED | OUTPATIENT
Start: 2024-06-13 | End: 2024-06-13 | Stop reason: HOSPADM

## 2024-06-13 RX ORDER — ONDANSETRON 2 MG/ML
4 INJECTION INTRAMUSCULAR; INTRAVENOUS EVERY 6 HOURS PRN
Status: DISCONTINUED | OUTPATIENT
Start: 2024-06-13 | End: 2024-06-13

## 2024-06-13 RX ORDER — NEOSTIGMINE METHYLSULFATE 1 MG/ML
INJECTION, SOLUTION INTRAVENOUS AS NEEDED
Status: DISCONTINUED | OUTPATIENT
Start: 2024-06-13 | End: 2024-06-13 | Stop reason: SURG

## 2024-06-13 RX ORDER — HYDROCODONE BITARTRATE AND ACETAMINOPHEN 5; 325 MG/1; MG/1
1-2 TABLET ORAL EVERY 4 HOURS PRN
Qty: 40 TABLET | Refills: 0 | Status: SHIPPED | OUTPATIENT
Start: 2024-06-13

## 2024-06-13 RX ORDER — DEXAMETHASONE SODIUM PHOSPHATE 4 MG/ML
VIAL (ML) INJECTION AS NEEDED
Status: DISCONTINUED | OUTPATIENT
Start: 2024-06-13 | End: 2024-06-13 | Stop reason: SURG

## 2024-06-13 RX ORDER — MIDAZOLAM HYDROCHLORIDE 1 MG/ML
INJECTION INTRAMUSCULAR; INTRAVENOUS AS NEEDED
Status: DISCONTINUED | OUTPATIENT
Start: 2024-06-13 | End: 2024-06-13 | Stop reason: SURG

## 2024-06-13 RX ORDER — BUPIVACAINE HYDROCHLORIDE 5 MG/ML
INJECTION, SOLUTION EPIDURAL; INTRACAUDAL AS NEEDED
Status: DISCONTINUED | OUTPATIENT
Start: 2024-06-13 | End: 2024-06-13 | Stop reason: HOSPADM

## 2024-06-13 RX ORDER — HYDROMORPHONE HYDROCHLORIDE 1 MG/ML
0.2 INJECTION, SOLUTION INTRAMUSCULAR; INTRAVENOUS; SUBCUTANEOUS EVERY 5 MIN PRN
Status: DISCONTINUED | OUTPATIENT
Start: 2024-06-13 | End: 2024-06-13

## 2024-06-13 RX ORDER — ACETAMINOPHEN 500 MG
1000 TABLET ORAL ONCE AS NEEDED
Status: COMPLETED | OUTPATIENT
Start: 2024-06-13 | End: 2024-06-13

## 2024-06-13 RX ORDER — LABETALOL HYDROCHLORIDE 5 MG/ML
5 INJECTION, SOLUTION INTRAVENOUS EVERY 5 MIN PRN
Status: DISCONTINUED | OUTPATIENT
Start: 2024-06-13 | End: 2024-06-13

## 2024-06-13 RX ORDER — MEPERIDINE HYDROCHLORIDE 25 MG/ML
12.5 INJECTION INTRAMUSCULAR; INTRAVENOUS; SUBCUTANEOUS AS NEEDED
Status: DISCONTINUED | OUTPATIENT
Start: 2024-06-13 | End: 2024-06-13

## 2024-06-13 RX ORDER — DOCUSATE SODIUM 100 MG/1
100 CAPSULE, LIQUID FILLED ORAL 2 TIMES DAILY
Qty: 30 CAPSULE | Refills: 1 | Status: SHIPPED | OUTPATIENT
Start: 2024-06-13

## 2024-06-13 RX ORDER — LIDOCAINE HYDROCHLORIDE 40 MG/ML
SOLUTION TOPICAL AS NEEDED
Status: DISCONTINUED | OUTPATIENT
Start: 2024-06-13 | End: 2024-06-13 | Stop reason: SURG

## 2024-06-13 RX ORDER — PROCHLORPERAZINE EDISYLATE 5 MG/ML
5 INJECTION INTRAMUSCULAR; INTRAVENOUS EVERY 8 HOURS PRN
Status: DISCONTINUED | OUTPATIENT
Start: 2024-06-13 | End: 2024-06-13

## 2024-06-13 RX ORDER — ROCURONIUM BROMIDE 10 MG/ML
INJECTION, SOLUTION INTRAVENOUS AS NEEDED
Status: DISCONTINUED | OUTPATIENT
Start: 2024-06-13 | End: 2024-06-13 | Stop reason: SURG

## 2024-06-13 RX ORDER — SCOLOPAMINE TRANSDERMAL SYSTEM 1 MG/1
1 PATCH, EXTENDED RELEASE TRANSDERMAL ONCE
Status: DISCONTINUED | OUTPATIENT
Start: 2024-06-13 | End: 2024-06-13

## 2024-06-13 RX ORDER — HYDROMORPHONE HYDROCHLORIDE 1 MG/ML
0.4 INJECTION, SOLUTION INTRAMUSCULAR; INTRAVENOUS; SUBCUTANEOUS EVERY 5 MIN PRN
Status: DISCONTINUED | OUTPATIENT
Start: 2024-06-13 | End: 2024-06-13

## 2024-06-13 RX ADMIN — SODIUM CHLORIDE, SODIUM LACTATE, POTASSIUM CHLORIDE, CALCIUM CHLORIDE: 600; 310; 30; 20 INJECTION, SOLUTION INTRAVENOUS at 11:38:00

## 2024-06-13 RX ADMIN — NEOSTIGMINE METHYLSULFATE 3 MG: 1 INJECTION, SOLUTION INTRAVENOUS at 11:36:00

## 2024-06-13 RX ADMIN — LIDOCAINE HYDROCHLORIDE 50 MG: 10 INJECTION, SOLUTION EPIDURAL; INFILTRATION; INTRACAUDAL; PERINEURAL at 07:52:00

## 2024-06-13 RX ADMIN — ONDANSETRON 4 MG: 2 INJECTION INTRAMUSCULAR; INTRAVENOUS at 11:36:00

## 2024-06-13 RX ADMIN — DEXAMETHASONE SODIUM PHOSPHATE 8 MG: 4 MG/ML VIAL (ML) INJECTION at 07:55:00

## 2024-06-13 RX ADMIN — GLYCOPYRROLATE 0.4 MG: 0.2 INJECTION, SOLUTION INTRAMUSCULAR; INTRAVENOUS at 11:36:00

## 2024-06-13 RX ADMIN — LIDOCAINE HYDROCHLORIDE 4 ML: 40 SOLUTION TOPICAL at 07:53:00

## 2024-06-13 RX ADMIN — EPHEDRINE SULFATE 10 MG: 50 INJECTION INTRAVENOUS at 10:20:00

## 2024-06-13 RX ADMIN — SODIUM CHLORIDE, SODIUM LACTATE, POTASSIUM CHLORIDE, CALCIUM CHLORIDE: 600; 310; 30; 20 INJECTION, SOLUTION INTRAVENOUS at 07:47:00

## 2024-06-13 RX ADMIN — SODIUM CHLORIDE, SODIUM LACTATE, POTASSIUM CHLORIDE, CALCIUM CHLORIDE: 600; 310; 30; 20 INJECTION, SOLUTION INTRAVENOUS at 09:46:00

## 2024-06-13 RX ADMIN — ROCURONIUM BROMIDE 80 MG: 10 INJECTION, SOLUTION INTRAVENOUS at 07:52:00

## 2024-06-13 RX ADMIN — MIDAZOLAM HYDROCHLORIDE 2 MG: 1 INJECTION INTRAMUSCULAR; INTRAVENOUS at 07:47:00

## 2024-06-13 NOTE — H&P
Cleared for surgery by Dr. Ferrera on 6/4/2024. No change in H&P, Patient wishes to proceed with surgery.   Clearance letter, labs, and EKG are in media

## 2024-06-13 NOTE — H&P
No change in Dr. Ferrera's H&P from 6/4. We reviewed the plan for abdominolasty with flank liposuction. We reviewed he risks of surgery including but not limited to bleeding, infection, seroma, dehiscence, skin necrosis, injury to intra-abdominal viscera, contour abnormality, umbilical necrosis, nerve injury and subsequent numbness, hypertrophic scarring or keloid, swelling, scar visibility, as well as medical risks including DVT and PE.  We reviewed the expected postoperative course including need for drains, activity limitation, abdominal binder, and suture removal.Multiple questions were answered the patient's satisfaction.  No guarantees as to outcome were offered.  The patient expresses understanding and wishes to proceed.

## 2024-06-13 NOTE — ANESTHESIA PREPROCEDURE EVALUATION
PRE-OP EVALUATION    Patient Name: Nikki Torres    Admit Diagnosis: Abdominal pannus [E65]    Pre-op Diagnosis: Abdominal pannus [E65]    Abdominoplasty (Cosmetic)    Anesthesia Procedure: Abdominoplasty (Cosmetic)    Surgeons and Role:     * Mason Leyva MD - Primary    Pre-op vitals reviewed.  Temp: 98.3 °F (36.8 °C)  Pulse: 94  Resp: 16  BP: 134/83  SpO2: 100 %  Body mass index is 27.15 kg/m².    Current medications reviewed.  Hospital Medications:   acetaminophen (Tylenol Extra Strength) tab 1,000 mg  1,000 mg Oral Once    scopolamine (Transderm-Scop) 1 MG/3DAYS patch 1 patch  1 patch Transdermal Once    lactated ringers infusion   Intravenous Continuous    ceFAZolin (Ancef) 2g in 10mL IV syringe premix  2 g Intravenous Once    EPINEPHrine (Adrenalin) 1 mg, lidocaine (Xylocaine) 1 % 50 mL in lactated ringers 1,000 mL tumescent mixture/irrigation   Infiltration Once (Intra-Op)       Outpatient Medications:     Medications Prior to Admission   Medication Sig Dispense Refill Last Dose    zinc sulfate 220 (50 Zn) MG Oral Cap Take 1 capsule (220 mg total) by mouth daily.   6/12/2024    Cholecalciferol (VITAMIN D-3 OR) Take 3,000 Units by mouth daily.   5/30/2024    Vitamin C 500 MG Oral Tab Take 1 tablet (500 mg total) by mouth daily.   6/12/2024    Omega-3 Fatty Acids (OMEGA 3 OR) Take 400 mg by mouth daily.   5/30/2024       Allergies: Patient has no known allergies.      Anesthesia Evaluation    Patient summary reviewed.    Anesthetic Complications  (-) history of anesthetic complications         GI/Hepatic/Renal    Negative GI/hepatic/renal ROS.                             Cardiovascular    Negative cardiovascular ROS.    Exercise tolerance: good     MET: >4                             (-) angina              Endo/Other    Negative endo/other ROS.                              Pulmonary    Negative pulmonary ROS.           (-) shortness of breath            Neuro/Psych    Negative neuro/psych ROS.                                   Past Surgical History:   Procedure Laterality Date      , , 2012    x3      Social History     Socioeconomic History    Marital status:    Tobacco Use    Smoking status: Never    Smokeless tobacco: Never   Vaping Use    Vaping status: Never Used   Substance and Sexual Activity    Alcohol use: Yes     Alcohol/week: 0.0 standard drinks of alcohol     Comment: socially     Drug use: No   Other Topics Concern    Caffeine Concern Yes     Comment: 1 to 2 cups per day     Exercise Yes     Comment: 4 times per week      History   Drug Use No     Available pre-op labs reviewed.               Airway      Mallampati: II  Mouth opening: 3 FB  TM distance: 4 - 6 cm  Neck ROM: full Cardiovascular      Rhythm: regular  Rate: normal     Dental             Pulmonary      Breath sounds clear to auscultation bilaterally.               Other findings              ASA: 1   Plan: general  NPO status verified and patient meets guidelines.    Post-procedure pain management plan discussed with surgeon and patient.      Plan/risks discussed with: patient and spouse                Present on Admission:  **None**

## 2024-06-13 NOTE — ANESTHESIA POSTPROCEDURE EVALUATION
[facility]    Nikki Torres Patient Status:  Hospital Outpatient Surgery   Age/Gender 48 year old female MRN [id number]   Location [facility] SURGERY Attending Mason Leyva MD   Hosp Day # 0 PCP Lisa Ferrera       Anesthesia Post-op Note    Abdominoplasty (Cosmetic)    Procedure Summary       Date: 06/13/24 Room / Location:  MAIN OR 04 / [facility] MAIN OR    Anesthesia Start: 0747 Anesthesia Stop:     Procedure: Abdominoplasty (Cosmetic) Diagnosis:       Abdominal pannus      (Abdominal pannus [E65])    Surgeons: Mason Leyva MD Anesthesiologist: Kyle Nation MD    Anesthesia Type: general ASA Status: 1            Anesthesia Type: general    Vitals Value Taken Time   /65 06/13/24 1146   Temp 99.5 degrees F 06/13/24 1146   Pulse 104 06/13/24 1146   Resp 16 06/13/24 1146   SpO2 96% 06/13/24 1146       Patient Location: PACU    Anesthesia Type: general    Airway Patency: patent and extubated    Postop Pain Control: adequate    Mental Status: mildly sedated but able to meaningfully participate in the post-anesthesia evaluation    Nausea/Vomiting: none    Cardiopulmonary/Hydration status: stable euvolemic    Complications: no apparent anesthesia related complications    Postop vital signs: stable    Dental Exam: Unchanged from Preop    Patient to be discharged from PACU when criteria met.

## 2024-06-13 NOTE — BRIEF OP NOTE
Pre-Operative Diagnosis: Abdominal pannus [E65]     Post-Operative Diagnosis: Abdominal pannus [E65]      Procedure Performed:   Abdominoplasty (Cosmetic)    Surgeons and Role:     * Mason Leyva MD - Primary    Assistant(s):  APN: María Hightower APRN     Surgical Findings: Nl     Specimen: None      Estimated Blood Loss: 30ml    Dictation Number:  See dictation    Mason Leyva MD  6/13/2024  11:30 AM

## 2024-06-13 NOTE — ANESTHESIA PROCEDURE NOTES
Airway  Date/Time: 6/13/2024 7:53 AM  Urgency: elective      General Information and Staff    Patient location during procedure: OR  Anesthesiologist: Kyle Nation MD  Performed: anesthesiologist   Performed by: Kyle Nation MD  Authorized by: Kyle Nation MD      Indications and Patient Condition  Indications for airway management: anesthesia  Sedation level: deep  Preoxygenated: yes  Patient position: sniffing  Mask difficulty assessment: 1 - vent by mask    Final Airway Details  Final airway type: endotracheal airway      Successful airway: ETT  Cuffed: yes   Successful intubation technique: direct laryngoscopy  Endotracheal tube insertion site: oral  Blade: Jose  Blade size: #3  ETT size (mm): 7.0    Cormack-Lehane Classification: grade I - full view of glottis  Placement verified by: capnometry   Measured from: lips  ETT to lips (cm): 21  Number of attempts at approach: 1

## 2024-06-13 NOTE — DISCHARGE INSTRUCTIONS
Plastic Surgery Home Care Instructions      We hope you were pleased with your care at [facility].  We wish you the best outcome and overall experience with your operation.  These instructions will help to minimize pain, optimize healing, and improve the likelihood of a successful result.    What To Expect  Swelling and discomfort in surgical area is expected.  You may have difficulty standing fully upright. This will gradually improve over time.  Please DO NOT apply heat or ice to the affected area  Temporary areas of numbness are typical in the early weeks following your procedure. Normalization of sensation can typically take up to several months following the operation.    Bandages (Dressing)  Wear abdominal binder at all times including at night when you sleep.  Leave foam under abdominal binder in place.  You can change the drain dressings if you need to (if they get wet). You will be given extra supplies from the hospital    Drain Care  Empty your drains at 8 am and 8 pm each day  Record each drain separately and use the drain sheet given to you to record the drainage. Follow the instructions on the drain sheet.  Wash your hands before and after emptying your drains  Strip drain tubing before emptying  If your drain dressing gets wet or you notice moisture on the drain dressing, remove dressing, clean with alcohol and apply a new biopatch and tegederm.  Bring drain sheet with you to your first office visit    Bathing/Showers  DO NOT get surgical area wet  No baths, swimming, or hot tubs until you receive medical permission    Pain Medication: Norco  Take one or two tablets every six hours as needed for pain.  Do not take narcotics, if you do not have pain.  Keep stools soft.  Take a stool softener (Metamucil, Milk of Magnesia, Colace).  Eating fruit will also help to prevent constipation    Over-The-Counter Medication  Non-prescription anti-inflammatory medications can also help to ease the pain.  You  can take Aleve or ibuprofen starting 48 hours after surgery  Take as directed on the bottle  Drink a full glass of water with the medication    Home Medication  Resume your home medications as instructed  Do not resume herbal medications for two weeks    Diet  Resume your normal diet    Activity  No strenuous activity or heavy lifting (no lifting over 5 pounds)  No activities that involve your abdominal muscles  You can go up and down the stairs as tolerated.    You cannot return to work, if your work requires strenuous activity.  You cannot return to physical exercise, sports, or gym workouts until you are allowed to participate in strenuous activity.    Driving  Do not drive, if you are taking pain medication.    Return to Work or School  You can return to work when you are not taking pain medication, if your work does not involve strenuous activity.  Contact the office, if you need a medical note.     Follow-up Appointment  Our office will call you to set up a time  Call the office for an appointment in two days if you cannot remember the appointment details.    Verify your appointment date, day, time, and location.  At your 1st postoperative office visit:  Your drains will be examined, wounds will be evaluated, healing assessed, and any additional concerns and instructions will be discussed.    Questions or Concerns  Call Dr. Leyva's office if you experience severe pain not controlled by pain medication, swelling, numbness, tingling, bleeding, fever, or other concerns.   If he is not available, another physician will be able to address your concerns.    Nikki   Thank you for coming to [facility] for your operation.  The nurses and the anesthesiologist try very hard to make sure you receive the best care possible.  Your trust in them is greatly appreciated.    Thanks so much,  ALTAGRACIA Jacob         Discharge Instructions: Caring for Your Cali-Chung Drainage Tube   Your healthcare  provider discharged you with a Cali-Chung drainage tube. They commonly leave this drain within the abdomen and other cavities after surgery. It helps drain and collect blood and body fluid after surgery. This can prevent swelling and reduces the risk for infection. The tube is held in place by a few stitches. It's covered with a bandage. Your healthcare provider will remove the drain when they determine you no longer need it.   Home care  Don’t sleep on the same side as the tube.  Secure the tube and bag inside your clothing with a safety pin. This helps keep the tube from being pulled out.  Empty your drain at least twice a day. Empty it more often if the drain is full. Wash and dry your hands before emptying the drain.  Lift the opening on the drain.  Drain the fluid into a measuring cup.  Record the amount of fluid each time you empty the drain. Include the date and time it was emptied. Share this information with your healthcare provider on your next visit.  Squeeze the bulb with your hands until you hear air coming out of the bulb if your healthcare provider has instructed you to do so (sometimes the bulb is used as a reservoir without suction). Check with your healthcare provider about specific drain instructions.  Close the opening.    If you are to change the dressing around the tube, follow the directions your provider has given you. Some dressings may not need to be changed, but your provider will let you know. Here are some general steps to follow:  Wash your hands.  Remove the old bandage.  Wash your hands again.  Clean the skin around the incision and tube site as instructed.  Put a new bandage on the incision and tube site. Make the bandage large enough to cover the whole incision area.  Tape the bandage in place.    Talk with your healthcare provider about showering with the drain. You may need to keep the bandage and tube site dry when you shower. Ask your healthcare team about the best way to do  this.  “Stripping” the tube helps keep blood clots from blocking the tube. Ask your healthcare team how often you should strip the tube. Stripping may not be needed, depending on where and why your healthcare provider placed the tube. It may even be dangerous in some cases.  Hold the tubing where it leaves the skin, with one hand. This keeps it from pulling on the skin.  Pinch the tubing with the thumb and first finger of your other hand.  Slowly and firmly pull your thumb and first finger down the tubing. You may find it helpful to hold an alcohol swab between your fingers and the tube to lubricate the tubing.  If the pulling hurts or feels like the tube is coming out of the skin, stop. Begin again more gently.    Follow-up care  Make a follow-up appointment as directed by our staff.   When to call your healthcare provider  Call your healthcare provider right away if you have any of the following:   New or increased pain around the tube  Redness, swelling, or warmth around the incision or tube  Drainage that is foul-smelling  Vomiting  Fever of 100.4°F ( 38°C) or higher, or as directed by your provider  Chills  Fluid leaking around the tube  Incision doesn't seem to be healing  Stitches become loose or the drain starts to come out  Tube falls out or breaks  Drainage that changes from light pink to dark red  Blood clots in the drainage bulb  A sudden increase or decrease in the amount of drainage (over 30 mL)  Nayla last reviewed this educational content on 3/1/2022  © 7308-7402 [employer]. All rights reserved. This information is not intended as a substitute for professional medical care. Always follow your healthcare professional's instructions.  Home Care Instructions  BERNICE GIL DRAIN CAre     Your drain is a specialized medical device designed to evacuate fluids from the  surgical space. Initial drainage may appear bloody, but the reddish fluid is  usually blood mixed with other body  fluids. The drainage may have thick clots at first, but should thin out over time.  The fluid should lighten to a clear pink or yellow color over the next two to four days.   Keep in mind, some blood in the drain is normal initially. But active bleeding will  stay thick with clots, be bright or dark red in color, and may fill the collection bulb over a short period of time. This is an indication of a hematoma, (a buildup of blood in the surgical area).  We discussed this prior to your operation.  Hematomas, are an unexpected, and rare complication that need medical attention. I need to know about this as soon as possible.   Your drain sites will be covered with a clear, plastic, dressing.  Leave this in place.  It will be removed with the drain in the office.  A prescription will be given to you for an oral antibiotic. You should take this oral antibiotic for as long as the drain is in place.  It will also help prevent a postoperative infection.   Bacteria can build up on the skin.  Daily or every-other-day showers using antibacterial soaps can help prevent infection. Keep showers brief and do not soak any surgical wound.   A useful trick is to pin your drain or drains to a ribbon, old tie, or belt draped around your neck. This allows you to use your hands for cleaning and prevents the drains from dangling from the stitch securing the drain in place.   In order to determine when to remove the drain, a record of the daily output is  needed. On a piece of paper, or the drain output log, given to you in the hospital, write down the date and the amount of drainage that has collected in the collection bulb. If you empty the bulb more than once, simply add up the daily total. When the total output of the drain is approximately 30 cc’s, in a 24 hour period, the drain is usually ready to be removed. Sometimes I will want to leave the drain in longer. Please be flexible, we both want the drains out as quickly as possible,  but occasionally I will want the total daily output less than 30 cc’s.   Final Points. I do my utmost to make the drain removal smooth, easy and  painless. If not painless, it is bearable. Do not fear the drain removal. When the  drain is ready, call my office. Either I or my nurse will remove it. Call my office if you have any questions that have not been addressed here, or during our post-operative discussions.   The phone number is [phone]                Home Care Instructions  BERNICE GIL DRAIN CARE    Date/Time 24 hour output Drain #1 output Drain #1 24 hour output Date/Time Drain #2 output

## 2024-06-13 NOTE — OPERATIVE REPORT
[facility]    PATIENT'S NAME: ROSIBEL DEWEY   ATTENDING PHYSICIAN: Mason Leyva M.D.   OPERATING PHYSICIAN: Mason Leyva M.D.   PATIENT ACCOUNT#:   [id number]    LOCATION:  [facility] 16 EDWP 10  MEDICAL RECORD #:   [id number]       YOB: 1975  ADMISSION DATE:       06/13/2024      OPERATION DATE:  06/13/2024    OPERATIVE REPORT      PREOPERATIVE DIAGNOSIS:  Lower abdominal skin and fatty excess.  POSTOPERATIVE DIAGNOSIS:  Lower abdominal skin and fatty excess.  PROCEDURE:  Abdominoplasty with flank liposuction.      ASSISTANT:  GUILLE Woodson    ANESTHESIA:  General.    ESTIMATED BLOOD LOSS:  30 mL.     COMPLICATIONS:  None.    INDICATIONS:  The patient is a 48-year-old female who presented with lower abdominal skin and fatty excess refractory to diet and lifestyle modifications.  She was offered the option of abdominoplasty and the patient wished to proceed.    OPERATIVE TECHNIQUE:  Informed consent was obtained from the patient.  The risks, benefits, and alternatives were reviewed with the patient preoperatively.  She expressed understanding and wished to proceed.  The patient was marked in the preoperative holding area in the upright position.  The midline was marked.  With  superior retraction of the abdominal pannus, the lower incision was marked approximately 2 cm below the patient's existing low transverse scar.  Areas of flank lipodystrophy were then marked for liposuction.  The proposed skin resection was then marked superiorly.  The patient was then taken to the operating room, properly identified, placed in a supine position.  Sequential compression devices were placed on bilateral lower extremities.  Intravenous antibiotic prophylaxis was administered.  The patient then underwent successful induction of general anesthesia and endotracheal intubation.  The arms were placed abducted on foam-padded cradles and loosely secured with Kerlix.  The chest and  abdomen were prepped and draped sterilely.  Stab incisions were created at the lateral aspects of the lower incision and 250 mL of tumescent solution was infiltrated into each flank for a total 500 mL.  The lower incision was then created centrally and deepened to the underlying fascia with electrocautery.  A superior subcutaneous flap was then elevated and dense scarring was noted on the abdominal wall from the previous low transverse scar.  This was released.  Next, the lower abdominal pannus was divided in the midline with a scalpel and electrocautery.  The umbilicus was then freed with a #11 scalpel.  A superior subcutaneous tunnel was then elevated to the xiphoid process.  Once the soft tissue had been elevated, attention was turned to the flanks.  Using a 3 mm Tulip cannula, hand-assisted liposuction of the flanks was performed.  Approximately 125 mL of lipoaspirate were collected bilaterally.  Once the contour had been improved, the remainder of the inferior incision was completed laterally.  The patient was then placed in the upright position and the proposed skin resection was marked.  The skin was then excised with a scalpel and electrocautery, excising sub-Rony fascia.  The flaps were then transposed and temporarily stapled in place.  The umbilicus was then inset.  A vertically oriented ellipse was incised with a scalpel.  Subcutaneous fat was then excised with electrocautery.  The umbilicus was tacked to the abdominal wall with 2-0 Vicryl sutures at the 3, 6, and 9 o'clock positions.  The umbilicus was then retrieved and inset with 3-0 Vicryl deep dermal sutures and then 5-0 Prolene mattress sutures.  Two 15-Setswana Ren drains were exited through lateral aspect of the abdominal incision and sutured to the skin with 3-0 nylon suture.  The abdominal wounds were then repaired in layered fashion with 0 Vicryl suture on Rony fascia, 3-0 Vicryl deep dermal sutures, and 4-0 Monocryl subcuticular suture.   Dermabond and Steri-Strips were placed on the incisions.  Bacitracin, Xeroform, gauze, and Tegaderm were placed on the umbilicus.  Biopatch and Tegaderm was placed on the drain sites.  An abdominal binder was placed.  The patient was straight cathed at the completion of the procedure.  She was then awakened, extubated, and taken to the recovery area in stable condition.  There were no operative complications.  All needle, sponge, and instrument counts were correct at the end of the procedure.     Dictated By Mason Leyva M.D.  d: 06/13/2024 11:44:05  t: 06/13/2024 13:34:19  [facility] [id number]  [facility]/

## 2024-06-21 ENCOUNTER — OFFICE VISIT (OUTPATIENT)
Dept: SURGERY | Facility: CLINIC | Age: 49
End: 2024-06-21

## 2024-06-21 DIAGNOSIS — Z98.890 H/O ABDOMINOPLASTY: Primary | ICD-10-CM

## 2024-06-21 PROCEDURE — 99024 POSTOP FOLLOW-UP VISIT: CPT

## 2024-06-21 NOTE — PROGRESS NOTES
Nikki Torres is a 48 year old female who presents today for a follow-up after abdominoplasty with flank liposuction with Dr. Leyva on 6/13/2024.    She denies fever and chills. She denies nausea, vomiting, diarrhea or constipation.   Her pain is controlled.      Physical Exam     Abdomen: Soft and appropriately tender.  Abdominal incision is clean, dry, intact.  No evidence of hematoma or seroma.  No evidence of open wound, wound drainage, necrosis.  Bilateral drain sites are clean, dry, intact.  Serosanguineous fluid is present.  Umbilicus viable with Prolene sutures present    There were no vitals filed for this visit.      Assessment and Plan     Nikki Torres is doing well s/p abdominoplasty with flank liposuction with Dr. Leyva on 6/13/2024.    Patient is here today for wound check and drain removal.  Her abdominal incision was redressed with new Steri-Strips.  The Prolene sutures from her umbilicus was removed today.  She handled this well.  A thin layer of Neosporin was placed to her umbilicus.  She was instructed to place Neosporin on her umbilicus daily.    Her right abdominal drain was removed today due to low volume output.  She handled this well.  A dressing of Neosporin, 2 x 2, Tegaderm was placed.  The left abdominal drain was left in place today due to high volume output.  This drain was redressed with a new Biopatch and Tegaderm.  Drain care and parameters were reviewed with the patient.  She was instructed to contact the office when this drain is ready to be removed    Compression and activity guidelines were reviewed with the patient.  She was encouraged to contact the office with any questions or concerns.  She is following up with Dr. Leyva on 7-16.    Questions were answered. Patient understands.     ALTAGRACIA Woodson  6/21/2024  8:51 AM

## 2024-06-25 ENCOUNTER — OFFICE VISIT (OUTPATIENT)
Dept: SURGERY | Facility: CLINIC | Age: 49
End: 2024-06-25

## 2024-06-25 DIAGNOSIS — Z98.890 H/O ABDOMINOPLASTY: Primary | ICD-10-CM

## 2024-06-25 PROCEDURE — 99024 POSTOP FOLLOW-UP VISIT: CPT

## 2024-06-25 NOTE — PROGRESS NOTES
Nikki Torres is a 48 year old female who presents today for a follow-up after abdominoplasty with flank liposuction with Dr. Leyva on 6/13/2024.     She denies fever and chills. She denies nausea, vomiting, diarrhea or constipation.   Her pain is controlled.      Physical Exam     Abdomen: Left and appropriately tender.  Abdominal incisions are clean, dry, intact.  There is no evidence of hematoma or seroma.  Umbilicus is viable.  Left breast drain site is clean, dry, intact with serous fluid present.    There were no vitals filed for this visit.      Assessment and Plan     Nikki Torres is doing well s/p abdominoplasty with flank liposuction with Dr. Leyva on 6/13/2024.     Patient is here today for wound check and drain removal.  The left abdominal drain was removed today due to low volume output.  The patient tolerated this well.  A new dressing of Neosporin, 2 x 2, Tegaderm was placed.  Neosporin was applied to the umbilicus.  Compression activity guidelines were reviewed with the patient.  She is following up with Dr. Leyva on 7-16.  She was encouraged to contact the office with any questions or concerns.    Questions were answered. Patient understands.     ALTAGRACIA Woodson  6/25/2024  1:34 PM

## 2024-07-09 ENCOUNTER — TELEPHONE (OUTPATIENT)
Dept: SURGERY | Facility: CLINIC | Age: 49
End: 2024-07-09

## 2024-07-09 NOTE — TELEPHONE ENCOUNTER
Returning pt's 's call (Corby) regarding the voicemail he left. Stated he had some questions regarding pt's cosmetic case that took place about 3 weeks ago. LVM for him to call me back with my direct call back number.

## 2024-07-09 NOTE — TELEPHONE ENCOUNTER
Corby returned my call and stated that he has received a bill from the hospital for $20,000. As this is a self pay case I told him that I believe this is in error as the hospital and anesthesia charges have already been paid at the discounted rate.   Let him know that I will message the hospital billing to have this looked into and corrected.  Also stated that I would call him back once I received confirmation. He verbalized understanding and was appreciative of my help.

## 2024-07-16 ENCOUNTER — OFFICE VISIT (OUTPATIENT)
Dept: SURGERY | Facility: CLINIC | Age: 49
End: 2024-07-16
Payer: COMMERCIAL

## 2024-07-16 DIAGNOSIS — Z98.890 H/O ABDOMINOPLASTY: Primary | ICD-10-CM

## 2024-07-16 PROCEDURE — 99024 POSTOP FOLLOW-UP VISIT: CPT | Performed by: SURGERY

## 2024-07-16 NOTE — PROGRESS NOTES
Nikki Torres is a 48 year old female who presents today in follow-up after undergoing abdominoplasty on 6/13.  She is without new complaints.      Physical Examination:  Abdomen: Incisions are clean dry and intact.  There is no erythema or seroma noted.    Assessment and Plan:  Patient is doing well.  We discussed scar care including massage and moisturizer and silicone products.  The patient may slowly increase activity with compression in place but should avoid strenuous core activity for 3 months postoperatively..  She will follow-up in 6 weeks for scar check.  The plan was reviewed with the patient and questions were answered.

## 2024-07-25 ENCOUNTER — TELEPHONE (OUTPATIENT)
Dept: SURGERY | Facility: CLINIC | Age: 49
End: 2024-07-25

## 2024-07-25 NOTE — TELEPHONE ENCOUNTER
LVM that amount he saw due for pt's case has been adjusted. Should he have any other issues or questions to call me back.    Left my direct call back number.

## 2024-08-27 ENCOUNTER — OFFICE VISIT (OUTPATIENT)
Dept: SURGERY | Facility: CLINIC | Age: 49
End: 2024-08-27
Payer: COMMERCIAL

## 2024-08-27 DIAGNOSIS — Z98.890 H/O ABDOMINOPLASTY: Primary | ICD-10-CM

## 2024-08-27 NOTE — PROGRESS NOTES
Nikki Torres is a 48 year old female who presents today in follow-up after undergoing abdominoplasty on 6/13. She is without new complaints.      Physical Examination:  Abdomen: Incisions are clean dry and intact.  There is no erythema or seroma noted.     Assessment and Plan:  Patient is doing well.  We discussed scar care including massage and moisturizer and silicone products.  The patient may slowly increase activity with compression in place but should avoid strenuous core activity for a total of 3 months postoperatively..  She will follow-up in 6 months for scar check.  The plan was reviewed with the patient and questions were answered.

## (undated) DEVICE — GLOVE SUR 6.5 SENSICARE PI PIP CRM PWD F

## (undated) DEVICE — SLEEVE COMPR MD KNEE LEN SGL USE KENDALL SCD

## (undated) DEVICE — SOLUTION IRRIG 1000ML 0.9% NACL USP BTL

## (undated) DEVICE — SUT PERMA- 3-0 30IN SH NABSRB BLK 26MM 1/2

## (undated) DEVICE — OCCLUSIVE GAUZE STRIP OVERWRAP,3% BISMUTH TRIBROMOPHENATE IN PETROLATUM BLEND: Brand: XEROFORM

## (undated) DEVICE — BINDER ABD UNISX 9IN 45IN SM AND M UNIV

## (undated) DEVICE — MARKER SKIN PREP RESIST STRL

## (undated) DEVICE — PAD DSG 8X12IN THK0.5IN FOAM SIL BK ADH

## (undated) DEVICE — 3M™ STERI-STRIP™ REINFORCED ADHESIVE SKIN CLOSURES, R1548, 1 IN X 5 IN (25 MM X 125 MM), 4 STRIPS/ENVELOPE: Brand: 3M™ STERI-STRIP™

## (undated) DEVICE — 3M™ TEGADERM™ TRANSPARENT FILM DRESSING FRAME STYLE, 1626W, 4 IN X 4-3/4 IN (10 CM X 12 CM), 50/CT 4CT/CASE: Brand: 3M™ TEGADERM™

## (undated) DEVICE — TRAY CATH 16FR F INCL BARDX IC COMPLT CARE

## (undated) DEVICE — SUT MCRYL 4-0 18IN PS-2 ABSRB UD 19MM 3/8 CIR

## (undated) DEVICE — AEGIS 1" DISK 4MM HOLE, PEEL OPEN: Brand: MEDLINE

## (undated) DEVICE — VIOLET BRAIDED (POLYGLACTIN 910), SYNTHETIC ABSORBABLE SUTURE: Brand: COATED VICRYL

## (undated) DEVICE — LAPAROTOMY SPONGE - RF AND X-RAY DETECTABLE PRE-WASHED: Brand: SITUATE

## (undated) DEVICE — ZZDISC - USE 405166-SUT COAT VCRL 3-0 27IN SH ABSRB UD 26MM 1/2

## (undated) DEVICE — ANTIBACTERIAL UNDYED BRAIDED (POLYGLACTIN 910), SYNTHETIC ABSORBABLE SUTURE: Brand: COATED VICRYL

## (undated) DEVICE — SUT ETHBND XL 0 18IN NABSRB GRN CR 26MM CT-2 1/2 CIR

## (undated) DEVICE — GLOVE SUR 7.5 SENSICARE PI PIP CRM PWD F

## (undated) DEVICE — EVACUATOR SUR 100CC SIL BLB WND

## (undated) DEVICE — SUT PROL 4-0 18IN PS-2 NABSRB BLU L19MM 3/8 C

## (undated) DEVICE — #11 STERILE BLADE: Brand: POLYMER COATED BLADES

## (undated) DEVICE — DRAPE,TOWEL,LARGE,INVISISHIELD: Brand: MEDLINE

## (undated) DEVICE — GOWN,SIRUS,FABRIC-REINFORCED,X-LARGE: Brand: MEDLINE

## (undated) DEVICE — ELECTRODE ES 2.75IN PTFE BLDE MOD E-Z CLN

## (undated) DEVICE — PROXIMATE SKIN STAPLERS (35 WIDE) CONTAINS 35 STAINLESS STEEL STAPLES (FIXED HEAD): Brand: PROXIMATE

## (undated) DEVICE — DRAIN SUR 15FR L3/16IN DIA4.7MM SIL RND

## (undated) DEVICE — CLIP APPLIER: Brand: PREMIUM SURGICLIP II

## (undated) DEVICE — GAUZE,SPONGE,2"X2",8PLY,STERILE,LF,2'S: Brand: MEDLINE

## (undated) DEVICE — PAD,ABDOMINAL,8"X7.5",ST,LF,20/BX: Brand: MEDLINE INDUSTRIES, INC.

## (undated) DEVICE — ADHESIVE SKIN TOP FOR WND CLSR DERMBND ADV

## (undated) DEVICE — SUT PROL 5-0 18IN P-3 NABSRB BLU L13MM 3/8 CI

## (undated) DEVICE — PLASTIC BREAST CDS-LF: Brand: MEDLINE INDUSTRIES, INC.

## (undated) DEVICE — 3M™ IOBAN™ 2 ANTIMICROBIAL INCISE DRAPE 6648EZ: Brand: IOBAN™ 2

## (undated) DEVICE — PIN SAFETY STERILE (2/PK)

## (undated) DEVICE — SUT ETHLN 3-0 18IN PS-2 NABSRB BLK 19MM 3/8 C

## (undated) NOTE — ED AVS SNAPSHOT
[facility] Emergency Department    [place]  [address] [place] [address]    Phone:  [phone]    Fax:  [address]    MRN: [id number]    Department:  [facility] Emergency Department   Date of Visit:  4/ IF THERE IS ANY CHANGE OR WORSENING OF YOUR CONDITION, CALL YOUR PRIMARY CARE PHYSICIAN AT ONCE OR RETURN IMMEDIATELY TO THE EMERGENCY DEPARTMENT.     If you have been prescribed any medication(s), please fill your prescription right away and begin taking t

## (undated) NOTE — MR AVS SNAPSHOT
[facility] [address] [facility]  [address] [facility], [address] [id number] [phone]               Thank you for choosing us for your health care visit with Harry Elam MD.  We are glad to serve you and happy to provide you with this s · Rest at home. Drink plenty of fluids to avoid dehydration. · No work or school for the first 2 days of taking the antibiotics. After this time, you will not be contagious.  You can then return to school or work if you are feeling better.   · The antibiot substitute for professional medical care. Always follow your healthcare professional's instructions.              Allergies as of Apr 03, 2017     No Known Allergies                Today's Vital Signs     BP Pulse Temp Height Weight BMI    94/68 mmHg 112 99 Visit [facility] online at  [email or web address]

## (undated) NOTE — MR AVS SNAPSHOT
[address]  [address] [address] [phone]               Thank you for choosing us for your health care visit with Hugo Munoz MD.  We are glad to serve you and happy to provide you with this flores Blood pressure All women in this age group Every 2 years if your blood pressure is less than 120/80 mm Hg; yearly if your systolic blood pressure is 120 to 139 mm Hg, or your diastolic blood pressure reading is 80 to 89 mm Hg   Breast cancer All women in t healthcare provider 3 doses over 6 months; second dose should be given 1 month after the first dose; the third dose should be given at least 2 months after the second dose and at least 4 months after the first dose   Haemophilus influenzae Type B (HIB) Wom Skin cancer Prevention of skin cancer in fair-skinned adults through age 25 At routine exams   Use of tobacco and the health effects it can cause All women in this age group Every visit   1According to the ACS, women ages 21 to 44 years should have a clini [address], [place] [address]     Phone:  [phone]    - ergocalciferol 65521 units Caps            MyChart               Educational Information     Healthy Diet and Regular Exercise  The [address]

## (undated) NOTE — ED AVS SNAPSHOT
[facility] Emergency Department    [place]  [address] [address]    Phone:  [phone]    Fax:  [address]   MRN: [id number]    Department:  [facility] Emergency Department   Date of Visit:  4/ have any questions regarding your home medications, including potential side effects.               Medication List      START taking these medications     ondansetron 4 MG Tbdp   Quantity:  10 tablet   Commonly known as:  ZOFRAN-ODT   Take 1 tablet (4 mg t You were examined and treated today on an urgent basis only. This was not a substitute for ongoing medical care. Often, one Emergency Department visit does not uncover every injury or illness.  If you have been referred to a primary care or a specialist ph Conrado Katz [address] ([address]) [address]  [place] ([address]) [phone] [address] ([address]) [phone]                Additional Information       We are concerned for your o